# Patient Record
(demographics unavailable — no encounter records)

---

## 2025-02-04 NOTE — HISTORY OF PRESENT ILLNESS
[FreeTextEntry1] : Mr. MUSA ROSARIO, 37 year old male, never smoker, w/ hx of fatty liver, stroke in Jan 2022, COVID, who presented with worsening acid reflux and food gets stuck to the chest. Initially referred by Dr. Nice (Follows for Iron deficiency)  EGD on 3/17/22: gastritis and hiatal hernia.  CT A/P with IV contrast 10/31/22 reviewed.  Manometry on 12/6/23: - normal swallow study - frequent weak swallows in supine position noted  CT Chest with oral contrast on 12/7/23: - moderate-sized type I/sliding hiatal hernia - mural thickening of the esophagus and proximal stomach, possibly esophagitis and/or gastritis  Barium esophagram on 12/8/23: - large hiatal hernia with gastroesophageal reflux  Now s/p hiatal hernia repair with a toupet fundoplication on 2/13/2024. Path revealed Gastric fat pad and hernia sac, excision:- Mesothelial-lined fibroadipose and fibroconnective tissue with blood extravasation.  Barium Esophagram today---  He presents today for 1 year follow up.

## 2025-02-04 NOTE — ASSESSMENT
[FreeTextEntry1] : Mr. MUSA ROSARIO, 37 year old male, never smoker, w/ hx of fatty liver, stroke in Jan 2022, COVID, who presented with worsening acid reflux and food gets stuck to the chest. Initially referred by Dr. Nice (Follows for Iron deficiency)  Now s/p hiatal hernia repair with a toupet fundoplication on 2/13/2024. Path revealed Gastric fat pad and hernia sac, excision:- Mesothelial-lined fibroadipose and fibroconnective tissue with blood extravasation.  Presents today for follow up with barium esophagram.    I have reviewed the patient's medical records and diagnostic images at time of this office consultation and have made the following recommendation: 1.   Recommendations reviewed with patient during this office visit, and all questions answered; Patient instructed on the importance of follow up and verbalizes understanding.

## 2025-04-14 NOTE — HISTORY OF PRESENT ILLNESS
[FreeTextEntry1] : Mr. MUSA ROSARIO, 37 year old male, never smoker, w/ hx of fatty liver, stroke in Jan 2022, COVID, who presented with worsening acid reflux and food gets stuck to the chest. Initially referred by Dr. Nice (Follows for Iron deficiency)  EGD on 3/17/22: gastritis and hiatal hernia.  CT A/P with IV contrast 10/31/22 reviewed.  Manometry on 12/6/23: - normal swallow study - frequent weak swallows in supine position noted  CT Chest with oral contrast on 12/7/23: - moderate-sized type I/sliding hiatal hernia - mural thickening of the esophagus and proximal stomach, possibly esophagitis and/or gastritis  Barium esophagram on 12/8/23: - large hiatal hernia with gastroesophageal reflux  Now s/p hiatal hernia repair with a toupet fundoplication on 2/13/2024. Path revealed Gastric fat pad and hernia sac, excision:- Mesothelial-lined fibroadipose and fibroconnective tissue with blood extravasation.  Barium Esophagram on 2/26/25:  - Normal esophagram status post Toupet fundoplication. - Mild gastric distention. Rule out gastroparesis  He presents today for 1 year follow up. Patient overall feels well. Tolerating a reqular diet. No dysphagia, regurgitation, heartburn or reflux. Denies nausea after eating. Having regular bowel movements. He overall feels well.

## 2025-04-14 NOTE — PHYSICAL EXAM
[] : no respiratory distress [Respiration, Rhythm And Depth] : normal respiratory rhythm and effort [Exaggerated Use Of Accessory Muscles For Inspiration] : no accessory muscle use [Auscultation Breath Sounds / Voice Sounds] : lungs were clear to auscultation bilaterally [Heart Rate And Rhythm] : heart rate was normal and rhythm regular [Examination Of The Chest] : the chest was normal in appearance [Chest Visual Inspection Thoracic Asymmetry] : no chest asymmetry [Diminished Respiratory Excursion] : normal chest expansion [2+] : left 2+ [Bowel Sounds] : normal bowel sounds [Cervical Lymph Nodes Enlarged Posterior Bilaterally] : posterior cervical [Cervical Lymph Nodes Enlarged Anterior Bilaterally] : anterior cervical [Supraclavicular Lymph Nodes Enlarged Bilaterally] : supraclavicular [Involuntary Movements] : no involuntary movements were seen [Skin Color & Pigmentation] : normal skin color and pigmentation [No Focal Deficits] : no focal deficits [Oriented To Time, Place, And Person] : oriented to person, place, and time

## 2025-04-14 NOTE — ASSESSMENT
[FreeTextEntry1] : Mr. MUSA ROSARIO, 37 year old male, never smoker, w/ hx of fatty liver, stroke in Jan 2022, COVID, who presented with worsening acid reflux and food gets stuck to the chest. Initially referred by Dr. Nice (Follows for Iron deficiency)  Now s/p hiatal hernia repair with a toupet fundoplication on 2/13/2024. Path revealed Gastric fat pad and hernia sac, excision:- Mesothelial-lined fibroadipose and fibroconnective tissue with blood extravasation.  Presents today for follow up with barium esophagram.    I have reviewed the patient's medical records and diagnostic images at time of this office consultation and have made the following recommendation: 1. Barium esophagram reviewed. Normal post op findings. However, noted to have mild gastric distention. Patient overall feels well. Tolerating regular diet with normal bowel movements. Denies nausea after eating. Recommend obtaining a repeat Barium Esophagram in 12 months to re-evaluate findings. Instructed to return to clinic 1-2 weeks following to discuss results and further plan of care. He is agreeable.   Recommendations reviewed with patient during this office visit, and all questions answered; Patient instructed on the importance of follow up and verbalizes understanding.